# Patient Record
Sex: MALE | URBAN - METROPOLITAN AREA
[De-identification: names, ages, dates, MRNs, and addresses within clinical notes are randomized per-mention and may not be internally consistent; named-entity substitution may affect disease eponyms.]

---

## 2020-05-05 ENCOUNTER — OFFICE VISIT (OUTPATIENT)
Dept: URBAN - METROPOLITAN AREA CLINIC 76 | Facility: CLINIC | Age: 35
End: 2020-05-05

## 2020-05-05 DIAGNOSIS — H52.223 ASTIGMATISM, REGULAR, BILATERAL: Chronic | ICD-10-CM

## 2020-05-05 DIAGNOSIS — H25.041 POSTERIOR SUBCAPSULAR POLAR AGE-RELATED CATARACT, RIGHT EYE: Primary | Chronic | ICD-10-CM

## 2020-05-05 PROCEDURE — 92004 COMPRE OPH EXAM NEW PT 1/>: CPT | Performed by: OPTOMETRIST

## 2020-05-05 ASSESSMENT — KERATOMETRY
OD: 42.88
OS: 43.00

## 2020-05-05 ASSESSMENT — INTRAOCULAR PRESSURE
OD: 15
OS: 16

## 2020-05-05 NOTE — IMPRESSION/PLAN
Impression: Posterior subcapsular polar age-related cataract, right eye: H25.041. Plan: Cataracts affecting vision some, but no surgery is currently recommended. However discussed the possibility or refractive cataract surgery. The patient will monitor vision changes and contact us with any decrease in vision. Continue to monitor.

## 2020-05-05 NOTE — IMPRESSION/PLAN
Impression: Astigmatism, regular, bilateral: H52.223. Plan: Patient can update rx when ready.  Continue to monitor